# Patient Record
Sex: MALE | Race: WHITE | Employment: FULL TIME | URBAN - METROPOLITAN AREA
[De-identification: names, ages, dates, MRNs, and addresses within clinical notes are randomized per-mention and may not be internally consistent; named-entity substitution may affect disease eponyms.]

---

## 2021-09-01 ENCOUNTER — OFFICE VISIT (OUTPATIENT)
Dept: OPTOMETRY | Facility: CLINIC | Age: 26
End: 2021-09-01
Payer: COMMERCIAL

## 2021-09-01 DIAGNOSIS — H52.223 REGULAR ASTIGMATISM OF BOTH EYES: ICD-10-CM

## 2021-09-01 DIAGNOSIS — H52.13 MYOPIA OF BOTH EYES: Primary | ICD-10-CM

## 2021-09-01 PROCEDURE — 92015 DETERMINE REFRACTIVE STATE: CPT | Performed by: OPTOMETRIST

## 2021-09-01 PROCEDURE — 92004 COMPRE OPH EXAM NEW PT 1/>: CPT | Performed by: OPTOMETRIST

## 2021-09-01 PROCEDURE — 92310 CONTACT LENS FITTING OU: CPT | Mod: GA | Performed by: OPTOMETRIST

## 2021-09-01 ASSESSMENT — VISUAL ACUITY
OS_SC: 20/100
OD_SC: 20/20
OS_PH_SC: 20/40
OD_SC+: -2
OS_SC+: -2
OD_PH_SC+: -1
OS_PH_SC+: -1
OD_PH_SC: 20/25
METHOD: SNELLEN - LINEAR
OD_SC: 20/70
OS_SC: 20/20

## 2021-09-01 ASSESSMENT — KERATOMETRY
OD_AXISANGLE2_DEGREES: 8
OS_K2POWER_DIOPTERS: 41.50
OS_K1POWER_DIOPTERS: 40.50
OD_K1POWER_DIOPTERS: 40.50
OS_AXISANGLE2_DEGREES: 6
OD_K2POWER_DIOPTERS: 41.50

## 2021-09-01 ASSESSMENT — REFRACTION_MANIFEST
OD_AXIS: 115
OD_SPHERE: -1.50
OS_AXIS: 100
OD_SPHERE: -2.25
OS_CYLINDER: +0.50
OD_CYLINDER: +0.50
OS_SPHERE: -1.75
OD_AXIS: 110
METHOD_AUTOREFRACTION: 1
OS_AXIS: 097
OD_CYLINDER: +0.75
OS_CYLINDER: +0.50
OS_SPHERE: -2.25

## 2021-09-01 ASSESSMENT — REFRACTION_CURRENTRX
OS_SPHERE: -1.50
OS_BRAND: J&J ACUVUE OASYS BC 8.4, D 14.0
OD_SPHERE: -1.25
OD_BRAND: J&J ACUVUE OASYS BC 8.4, D 14.0

## 2021-09-01 ASSESSMENT — SLIT LAMP EXAM - LIDS
COMMENTS: NORMAL
COMMENTS: NORMAL

## 2021-09-01 ASSESSMENT — CUP TO DISC RATIO
OD_RATIO: 0.2
OS_RATIO: 0.2

## 2021-09-01 ASSESSMENT — TONOMETRY
IOP_METHOD: APPLANATION
OD_IOP_MMHG: 17
OS_IOP_MMHG: 17

## 2021-09-01 ASSESSMENT — CONF VISUAL FIELD
METHOD: COUNTING FINGERS
OD_NORMAL: 1
OS_NORMAL: 1

## 2021-09-01 ASSESSMENT — EXTERNAL EXAM - RIGHT EYE: OD_EXAM: NORMAL

## 2021-09-01 ASSESSMENT — EXTERNAL EXAM - LEFT EYE: OS_EXAM: NORMAL

## 2021-09-01 NOTE — PATIENT INSTRUCTIONS
Fill glasses prescription  Contact lenses prescription released to patient today.  Test new trials one week, then alright to order supply.  Return for contact lenses check appointment as needed if any vision or comfort concerns  Return in 1-2 years for eye exam    Preeti López, OD  [phone]

## 2021-09-01 NOTE — PROGRESS NOTES
Chief Complaint   Patient presents with     COMPREHENSIVE EYE EXAM     Eye Exam/ Fitting, New to Eye Dept      Contact Lens Re-fitting        Previous contact lens wearer? Yes: Out of lenses, not sure what they were   Comfort of contact lenses :Ok   Satisfied with current lenses: The last ones were fine         Last Eye Exam: 2016, in [place]   Dilated Previously: Yes    What are you currently using to see?  Currently does not have a pair of glasses or any contacts     Distance Vision Acuity: Noticed gradual change in both eyes, need correction. Getting by, but needs glasses or contacts     Near Vision Acuity: Satisfied with vision while reading and using computer unaided    Eye Comfort: good  Do you use eye drops? : No  Occupation or Hobbies:        [employer]      Medical, surgical and family histories reviewed and updated 9/1/2021.       OBJECTIVE: See Ophthalmology exam    ASSESSMENT:    ICD-10-CM    1. Myopia of both eyes  H52.13 EYE EXAM (SIMPLE-NONBILLABLE)     REFRACTION     CONTACT LENS FITTING,BILAT w/ signed waiver   2. Regular astigmatism of both eyes  H52.223 EYE EXAM (SIMPLE-NONBILLABLE)     REFRACTION     CONTACT LENS FITTING,BILAT w/ signed waiver      PLAN:     Patient Instructions   Fill glasses prescription  Contact lenses prescription released to patient today.  Test new trials one week, then alright to order supply.  Return for contact lenses check appointment as needed if any vision or comfort concerns  Return in 1-2 years for eye exam    Preeti López, OD  [phone]

## 2021-09-01 NOTE — LETTER
9/1/2021         RE: Agusto Bennett  [id number]  [facility] [id number]        Dear Colleague,    Thank you for referring your patient, Agusto Bennett, to the [facility]. Please see a copy of my visit note below.    Chief Complaint   Patient presents with     COMPREHENSIVE EYE EXAM     Eye Exam/ Fitting, New to Eye Dept      Contact Lens Re-fitting        Previous contact lens wearer? Yes: Out of lenses, not sure what they were   Comfort of contact lenses :Ok   Satisfied with current lenses: The last ones were fine         Last Eye Exam: 2016, in [place]   Dilated Previously: Yes    What are you currently using to see?  Currently does not have a pair of glasses or any contacts     Distance Vision Acuity: Noticed gradual change in both eyes, need correction. Getting by, but needs glasses or contacts     Near Vision Acuity: Satisfied with vision while reading and using computer unaided    Eye Comfort: good  Do you use eye drops? : No  Occupation or Hobbies:        [employer]      Medical, surgical and family histories reviewed and updated 9/1/2021.       OBJECTIVE: See Ophthalmology exam    ASSESSMENT:    ICD-10-CM    1. Myopia of both eyes  H52.13 EYE EXAM (SIMPLE-NONBILLABLE)     REFRACTION     CONTACT LENS FITTING,BILAT w/ signed waiver   2. Regular astigmatism of both eyes  H52.223 EYE EXAM (SIMPLE-NONBILLABLE)     REFRACTION     CONTACT LENS FITTING,BILAT w/ signed waiver      PLAN:     Patient Instructions   Fill glasses prescription  Contact lenses prescription released to patient today.  Test new trials one week, then alright to order supply.  Return for contact lenses check appointment as needed if any vision or comfort concerns  Return in 1-2 years for eye exam    Preeti López, OD  [phone]                                                   Again, thank you for allowing me to participate in the care of your patient.         Sincerely,        Preeti López, OD

## 2022-02-06 ENCOUNTER — HEALTH MAINTENANCE LETTER (OUTPATIENT)
Age: 27
End: 2022-02-06

## 2022-10-03 ENCOUNTER — HEALTH MAINTENANCE LETTER (OUTPATIENT)
Age: 27
End: 2022-10-03

## 2023-02-11 ENCOUNTER — HEALTH MAINTENANCE LETTER (OUTPATIENT)
Age: 28
End: 2023-02-11

## 2024-03-09 ENCOUNTER — HEALTH MAINTENANCE LETTER (OUTPATIENT)
Age: 29
End: 2024-03-09

## 2025-06-29 ENCOUNTER — OFFICE VISIT (OUTPATIENT)
Dept: URGENT CARE | Facility: URGENT CARE | Age: 30
End: 2025-06-29
Payer: COMMERCIAL

## 2025-06-29 VITALS
DIASTOLIC BLOOD PRESSURE: 74 MMHG | HEART RATE: 80 BPM | RESPIRATION RATE: 14 BRPM | SYSTOLIC BLOOD PRESSURE: 129 MMHG | BODY MASS INDEX: 23.84 KG/M2 | HEIGHT: 72 IN | TEMPERATURE: 98.6 F | WEIGHT: 176 LBS | OXYGEN SATURATION: 100 %

## 2025-06-29 DIAGNOSIS — S61.211A LACERATION OF LEFT INDEX FINGER WITHOUT FOREIGN BODY WITHOUT DAMAGE TO NAIL, INITIAL ENCOUNTER: Primary | ICD-10-CM

## 2025-06-29 PROCEDURE — 12001 RPR S/N/AX/GEN/TRNK 2.5CM/<: CPT

## 2025-06-29 PROCEDURE — 3074F SYST BP LT 130 MM HG: CPT

## 2025-06-29 PROCEDURE — 3078F DIAST BP <80 MM HG: CPT

## 2025-06-29 PROCEDURE — 1125F AMNT PAIN NOTED PAIN PRSNT: CPT

## 2025-06-29 RX ORDER — METHYLPHENIDATE HYDROCHLORIDE 18 MG/1
TABLET ORAL
COMMUNITY
Start: 2025-06-23

## 2025-06-29 RX ORDER — BUPROPION HYDROCHLORIDE 150 MG/1
150 TABLET ORAL DAILY
COMMUNITY

## 2025-06-29 ASSESSMENT — PAIN SCALES - GENERAL: PAINLEVEL_OUTOF10: MILD PAIN (2)

## 2025-06-29 NOTE — PROGRESS NOTES
ASSESSMENT:  (X36.818N) Laceration of left index finger without foreign body without damage to nail, initial encounter  (primary encounter diagnosis)  Plan: WOUND CLOSURE BY ADHESIVE    PLAN:  Wound cleaned with HIBICLENS  Wound cleaned with saline  Wound soaked  Dermabond was applied    After care instructions:   We discussed the need to keep the wound clean and dry for the next 48 hours and after 48 hours it is okay to get wet but do not submerge.  Discussed using bacitracin and Band-Aid to the area for dressing.  Informed the patient that the skin glue will dissolve as the wound heals.  Discussed the need to return to clinic with any redness, swelling, drainage, bleeding, pain and/or fever/chills.  Patient acknowledged their understanding of the above plan.     The use of Dragon[employer] services may have been used to construct the content in this note; any grammatical or spelling errors are non-intentional. Please contact the author of this note directly if you are in need of any clarification.      MICHELLE Sevilla CNP        SUBJECTIVE:  Agusto Bennett is a 30 year old male who presents to the clinic with a laceration on the left index finger sustained earlier today after cutting it on a knife. This is a non-work related injury.     Associated symptoms: Denies numbness, weakness, or loss of function  Last tetanus booster within 5 years: yes    ROS:   Negative except noted above.    OBJECTIVE:  Blood pressure 129/74, pulse 80, temperature 98.6  F (37  C), temperature source Tympanic, resp. rate 14, height 1.829 m (6'), weight 79.8 kg (176 lb), SpO2 100%.  The patient appears today in alert and in no apparent distress distress  Size of laceration: 1 centimeters  Location: Posterior aspect of the left index finger  Characteristics of the laceration: straight and superficial  Tendon function intact: yes  Sensation to light touch intact: yes  Pulses intact: yes

## 2025-06-29 NOTE — PROGRESS NOTES
Urgent Care Clinic Visit    Chief Complaint   Patient presents with    Laceration     Left index finger. 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           Review of last Tetanus vaccine: Tetanus vaccine has been given within past 5 years: 09/25/2023 6/29/2025     5:09 PM   Additional Questions   Roomed by geovanna   Accompanied by self     Nuvia Baptiste CMA on 6/29/2025 at 5:16 PM

## 2025-06-29 NOTE — PATIENT INSTRUCTIONS
Keep the wound clean and dry for the next 48 hours.  After 48 hours it is okay to get wet but do not submerge.  Bacitracin and Band-Aid to the area for dressing.  Skin glue will dissolve as the wound heals.  Return to clinic, with any redness, swelling, drainage, bleeding, pain and/or fever/chills.

## 2025-07-20 ENCOUNTER — HEALTH MAINTENANCE LETTER (OUTPATIENT)
Age: 30
End: 2025-07-20